# Patient Record
Sex: MALE | Race: WHITE
[De-identification: names, ages, dates, MRNs, and addresses within clinical notes are randomized per-mention and may not be internally consistent; named-entity substitution may affect disease eponyms.]

---

## 2018-12-10 ENCOUNTER — HOSPITAL ENCOUNTER (EMERGENCY)
Dept: HOSPITAL 26 - MED | Age: 38
Discharge: HOME | End: 2018-12-10
Payer: SELF-PAY

## 2018-12-10 VITALS — SYSTOLIC BLOOD PRESSURE: 145 MMHG | DIASTOLIC BLOOD PRESSURE: 100 MMHG

## 2018-12-10 VITALS — DIASTOLIC BLOOD PRESSURE: 93 MMHG | SYSTOLIC BLOOD PRESSURE: 139 MMHG

## 2018-12-10 VITALS — BODY MASS INDEX: 30.92 KG/M2 | WEIGHT: 204 LBS | HEIGHT: 68 IN

## 2018-12-10 DIAGNOSIS — R00.2: Primary | ICD-10-CM

## 2018-12-10 DIAGNOSIS — Z90.49: ICD-10-CM

## 2018-12-10 DIAGNOSIS — R11.0: ICD-10-CM

## 2018-12-10 PROCEDURE — 99283 EMERGENCY DEPT VISIT LOW MDM: CPT

## 2018-12-10 PROCEDURE — 93005 ELECTROCARDIOGRAM TRACING: CPT

## 2018-12-10 NOTE — NUR
Patient discharged with v/s stable. Written and verbal after care instructions 
given and explained. 

Patient alert, oriented and verbalized understanding of instructions. 
Ambulatory with steady gait. All questions addressed prior to discharge. ID 
band removed. Patient advised to follow up with PMD. Rx of ZOFRAN given. 
Patient educated on indication of medication including possible reaction and 
side effects. Opportunity to ask questions provided and answered.

## 2018-12-10 NOTE — NUR
PT BIB BY SELF, C/O NAUSEA, DENIES VOMITING, CHEST PAIN AT THIS TIME. PT. 
STATES THAT FELT HEART RACE, FELT AS IF SOMETHING WAS STUCK IN HIS THROAT. WENT 
TO PHARMACY TO TAKE BP, REPORTS /98, RETOOK 145/95, RETOOK 127/85. 
REPORTS HAVING SWEATS. 

HX: A FIB 6 YEARS AGO

RX: NO RX